# Patient Record
Sex: FEMALE | Race: WHITE | NOT HISPANIC OR LATINO | Employment: UNEMPLOYED | URBAN - METROPOLITAN AREA
[De-identification: names, ages, dates, MRNs, and addresses within clinical notes are randomized per-mention and may not be internally consistent; named-entity substitution may affect disease eponyms.]

---

## 2019-03-24 ENCOUNTER — APPOINTMENT (EMERGENCY)
Dept: RADIOLOGY | Facility: HOSPITAL | Age: 79
End: 2019-03-24
Payer: COMMERCIAL

## 2019-03-24 ENCOUNTER — HOSPITAL ENCOUNTER (EMERGENCY)
Facility: HOSPITAL | Age: 79
Discharge: HOME | End: 2019-03-24
Attending: EMERGENCY MEDICINE | Admitting: EMERGENCY MEDICINE
Payer: COMMERCIAL

## 2019-03-24 VITALS
OXYGEN SATURATION: 99 % | DIASTOLIC BLOOD PRESSURE: 78 MMHG | TEMPERATURE: 97.9 F | RESPIRATION RATE: 16 BRPM | HEART RATE: 81 BPM | SYSTOLIC BLOOD PRESSURE: 137 MMHG | WEIGHT: 178 LBS

## 2019-03-24 DIAGNOSIS — I35.0 AORTIC STENOSIS: ICD-10-CM

## 2019-03-24 DIAGNOSIS — R55 SYNCOPE: Primary | ICD-10-CM

## 2019-03-24 LAB
ALBUMIN SERPL BCP-MCNC: 4 G/DL (ref 3–5.2)
ALP SERPL-CCNC: 68 U/L (ref 43–122)
ALT SERPL W P-5'-P-CCNC: 18 U/L (ref 9–52)
ANION GAP SERPL CALCULATED.3IONS-SCNC: 10 MMOL/L (ref 5–14)
AST SERPL W P-5'-P-CCNC: 19 U/L (ref 14–36)
BASOPHILS # BLD AUTO: 0 THOUSANDS/ΜL (ref 0–0.1)
BASOPHILS NFR BLD AUTO: 0 % (ref 0–1)
BILIRUB SERPL-MCNC: 0.4 MG/DL
BUN SERPL-MCNC: 28 MG/DL (ref 5–25)
CALCIUM SERPL-MCNC: 9.6 MG/DL (ref 8.4–10.2)
CHLORIDE SERPL-SCNC: 100 MMOL/L (ref 97–108)
CO2 SERPL-SCNC: 26 MMOL/L (ref 22–30)
CREAT SERPL-MCNC: 0.91 MG/DL (ref 0.6–1.2)
EOSINOPHIL # BLD AUTO: 0.2 THOUSAND/ΜL (ref 0–0.4)
EOSINOPHIL NFR BLD AUTO: 2 % (ref 0–6)
ERYTHROCYTE [DISTWIDTH] IN BLOOD BY AUTOMATED COUNT: 13.7 %
GFR SERPL CREATININE-BSD FRML MDRD: 61 ML/MIN/1.73SQ M
GLUCOSE SERPL-MCNC: 153 MG/DL (ref 70–99)
GLUCOSE SERPL-MCNC: 158 MG/DL (ref 65–140)
HCT VFR BLD AUTO: 36.8 % (ref 36–46)
HGB BLD-MCNC: 12 G/DL (ref 12–16)
LYMPHOCYTES # BLD AUTO: 2.1 THOUSANDS/ΜL (ref 0.5–4)
LYMPHOCYTES NFR BLD AUTO: 23 % (ref 25–45)
MCH RBC QN AUTO: 28.6 PG (ref 26–34)
MCHC RBC AUTO-ENTMCNC: 32.5 G/DL (ref 31–36)
MCV RBC AUTO: 88 FL (ref 80–100)
MONOCYTES # BLD AUTO: 0.8 THOUSAND/ΜL (ref 0.2–0.9)
MONOCYTES NFR BLD AUTO: 9 % (ref 1–10)
NEUTROPHILS # BLD AUTO: 6.2 THOUSANDS/ΜL (ref 1.8–7.8)
NEUTS SEG NFR BLD AUTO: 67 % (ref 45–65)
PLATELET # BLD AUTO: 240 THOUSANDS/UL (ref 150–450)
PMV BLD AUTO: 8 FL (ref 8.9–12.7)
POTASSIUM SERPL-SCNC: 4.8 MMOL/L (ref 3.6–5)
PROT SERPL-MCNC: 7.4 G/DL (ref 5.9–8.4)
RBC # BLD AUTO: 4.19 MILLION/UL (ref 4–5.2)
SODIUM SERPL-SCNC: 136 MMOL/L (ref 137–147)
TROPONIN I SERPL-MCNC: <0.01 NG/ML (ref 0–0.03)
WBC # BLD AUTO: 9.3 THOUSAND/UL (ref 4.5–11)

## 2019-03-24 PROCEDURE — 93005 ELECTROCARDIOGRAM TRACING: CPT

## 2019-03-24 PROCEDURE — 84484 ASSAY OF TROPONIN QUANT: CPT | Performed by: EMERGENCY MEDICINE

## 2019-03-24 PROCEDURE — 85025 COMPLETE CBC W/AUTO DIFF WBC: CPT | Performed by: EMERGENCY MEDICINE

## 2019-03-24 PROCEDURE — 36415 COLL VENOUS BLD VENIPUNCTURE: CPT | Performed by: EMERGENCY MEDICINE

## 2019-03-24 PROCEDURE — 99284 EMERGENCY DEPT VISIT MOD MDM: CPT

## 2019-03-24 PROCEDURE — 71046 X-RAY EXAM CHEST 2 VIEWS: CPT

## 2019-03-24 PROCEDURE — 82948 REAGENT STRIP/BLOOD GLUCOSE: CPT

## 2019-03-24 PROCEDURE — 80053 COMPREHEN METABOLIC PANEL: CPT | Performed by: EMERGENCY MEDICINE

## 2019-03-24 NOTE — ED NOTES
Pt changed into a gown and made comfortable in room  Pt placed on monitor for continuous cardiac monitoring  EKG done and given to Dr  For review   Glucose done 158 Dr Jagdish Padilla  03/24/19 4982

## 2019-03-24 NOTE — ED NOTES
Pt remains asymptomatic and with no complaints  Dr Kizzy Willoughby in speaking with pt about admission, pt refusing and requesting to go home  See provider charting         Anisha Berman RN  03/24/19 1157

## 2019-03-24 NOTE — ED NOTES
Pt missed toilet hat to collect urine sample    Labels had been scanned but no sample collected, will have pt try again later     Nina Call, PAMELA  03/24/19 5586

## 2019-03-24 NOTE — ED NOTES
Pt sitting up talking with     Pt has no complaints at this time     James Franco, RN  03/24/19 3426

## 2019-03-24 NOTE — ED PROVIDER NOTES
Pt Name: Williams Avalos  MRN: [id number]  [place] 1940  Age/Sex: 69 y o  female  Date of evaluation: 3/24/2019  PCP: Farida Carpenter    CHIEF COMPLAINT    Chief Complaint   Patient presents with    Syncope     pt had syncopal episode at [facility], witness stated to EMS pt was sitting and then slid over laying down on pew, (+) LOC for several seconds  pt arrives awake and alert, pt has no complaints, denies cp/sob/ha/dizzy         HPI    Oren Allen presents to the Emergency Department complaining of syncope  She was at [facility] and she was feeling warm  She was going to take off her blazer but she decided to leave it on since there was only 10 minutes left in the service  The next thing that she remembers is that she passed out in the pew  No chest pain or SOB  She is feeling normal now  She has no complaints  HPI      Past Medical and Surgical History    Past Medical History:   Diagnosis Date    Diabetes mellitus ([address] )     Hypertension        Past Surgical History:   Procedure Laterality Date    HYSTERECTOMY         History reviewed  No pertinent family history  Social History     Tobacco Use    Smoking status: Never Smoker    Smokeless tobacco: Never Used   Substance Use Topics    Alcohol use: Not Currently    Drug use: Never           Allergies    No Known Allergies    Home Medications    Prior to Admission medications    Not on File           Review of Systems    Review of Systems   Constitutional: Negative for activity change, appetite change, chills, diaphoresis, fatigue and fever  HENT: Negative for congestion, postnasal drip, rhinorrhea, sinus pressure, sneezing and sore throat  Eyes: Negative for pain and visual disturbance  Respiratory: Negative for cough, chest tightness and shortness of breath  Cardiovascular: Negative for chest pain, palpitations and leg swelling     Gastrointestinal: Negative for abdominal distention, abdominal pain, constipation, diarrhea, nausea and vomiting  Endocrine: Negative for polydipsia, polyphagia and polyuria  Genitourinary: Negative for decreased urine volume, difficulty urinating, dysuria, flank pain, frequency and hematuria  Musculoskeletal: Negative for arthralgias, gait problem, joint swelling and neck pain  Skin: Negative for pallor and rash  Allergic/Immunologic: Negative for immunocompromised state  Neurological: Negative for syncope, speech difficulty, weakness, light-headedness, numbness and headaches  All other systems reviewed and are negative  Physical Exam      ED Triage Vitals [03/24/19 1243]   Temperature Pulse Respirations Blood Pressure SpO2   97 9 °F (36 6 °C) 85 16 (!) 149/106 98 %      Temp Source Heart Rate Source Patient Position - Orthostatic VS BP Location FiO2 (%)   Tympanic Monitor Lying Left arm --      Pain Score       --               Physical Exam   Constitutional: She is oriented to person, place, and time  She appears well-developed and well-nourished  No distress  HENT:   Head: Normocephalic and atraumatic  Nose: Nose normal    Mouth/Throat: Oropharynx is clear and moist    Eyes: Pupils are equal, round, and reactive to light  Conjunctivae, EOM and lids are normal    Neck: Normal range of motion  Neck supple  Cardiovascular: Normal rate and regular rhythm  Exam reveals no gallop and no friction rub  Murmur heard  Systolic murmur is present with a grade of 4/6  Pulmonary/Chest: Effort normal and breath sounds normal  No accessory muscle usage  No respiratory distress  She has no wheezes  She has no rales  Abdominal: Soft  She exhibits no distension  There is no tenderness  There is no rebound and no guarding  Neurological: She is alert and oriented to person, place, and time  No cranial nerve deficit or sensory deficit  Skin: Skin is warm and dry  No rash noted  She is not diaphoretic  No erythema  Psychiatric: She has a normal mood and affect   Her speech is normal and behavior is normal  Judgment and thought content normal    Nursing note and vitals reviewed  Assessment and Plan    Walt Johns is a 69 y o  female who presents with syncope  Physical examination remarkable for significant murmur  Plan will be to perform diagnostic testing and treat symptomatically  MDM    Diagnostic Results      EKG INTERPRETATION  EKG Interpretation    Rate: 88 BPM  Rhythm: sinus with PVCs  Axis: normal  Intervals: Normal, RBBB, QTc 498ms  T waves: nonspecific  ST segments: nonspecific    Impression: abnormal EKG  EKG for comparison: not immediately available  EKG interpreted by me  Interpretation by Jossy Vaughn DO  EKG reviewed and interpreted independently      Labs:    Results for orders placed or performed during the hospital encounter of 03/24/19   CBC and differential   Result Value Ref Range    WBC 9 30 4 50 - 11 00 Thousand/uL    RBC 4 19 4 00 - 5 20 Million/uL    Hemoglobin 12 0 12 0 - 16 0 g/dL    Hematocrit 36 8 36 0 - 46 0 %    MCV 88 80 - 100 fL    MCH 28 6 26 0 - 34 0 pg    MCHC 32 5 31 0 - 36 0 g/dL    RDW 13 7 <15 3 %    MPV 8 0 (L) 8 9 - 12 7 fL    Platelets 140 462 - 128 Thousands/uL    Neutrophils Relative 67 (H) 45 - 65 %    Lymphocytes Relative 23 (L) 25 - 45 %    Monocytes Relative 9 1 - 10 %    Eosinophils Relative 2 0 - 6 %    Basophils Relative 0 0 - 1 %    Neutrophils Absolute 6 20 1 80 - 7 80 Thousands/µL    Lymphocytes Absolute 2 10 0 50 - 4 00 Thousands/µL    Monocytes Absolute 0 80 0 20 - 0 90 Thousand/µL    Eosinophils Absolute 0 20 0 00 - 0 40 Thousand/µL    Basophils Absolute 0 00 0 00 - 0 10 Thousands/µL   Comprehensive metabolic panel   Result Value Ref Range    Sodium 136 (L) 137 - 147 mmol/L    Potassium 4 8 3 6 - 5 0 mmol/L    Chloride 100 97 - 108 mmol/L    CO2 26 22 - 30 mmol/L    ANION GAP 10 5 - 14 mmol/L    BUN 28 (H) 5 - 25 mg/dL    Creatinine 0 91 0 60 - 1 20 mg/dL    Glucose 153 (H) 70 - 99 mg/dL    Calcium 9 6 8 4 - 10 2 mg/dL AST 19 14 - 36 U/L    ALT 18 9 - 52 U/L    Alkaline Phosphatase 68 43 - 122 U/L    Total Protein 7 4 5 9 - 8 4 g/dL    Albumin 4 0 3 0 - 5 2 g/dL    Total Bilirubin 0 40 <1 30 mg/dL    eGFR 61 >60 ml/min/1 73sq m   Troponin I   Result Value Ref Range    Troponin I <0 01 0 00 - 0 03 ng/mL   Fingerstick Glucose (POCT)   Result Value Ref Range    POC Glucose 158 (H) 65 - 140 mg/dl       All labs reviewed and utilized in the medical decision making process    Radiology:    XR chest 2 views    (Results Pending)     Normal    All radiology studies independently viewed by me and interpreted by the radiologist     Procedure    Procedures    [facility]      ED Course of Care and Re-Assessments    I had a long conversation with patient  I recommended admission/ cardiology eval and echo  She feels that this was related to feeling warm and being overdressed  She will arrange for outpatient eval   She understands that I am concerned regarding her murmur and last echo nearly a year ago with new syncope  She fells well and wants to go home, understanding the risks involved  Medications - No data to display        FINAL IMPRESSION    Final diagnoses:   Syncope   Aortic stenosis         DISPOSITION/PLAN    Time reflects when diagnosis was documented in both MDM as applicable and the Disposition within this note     Time User Action Codes Description Comment    3/24/2019  3:14 PM Jennifer Aguilera Add [R55] Syncope     3/24/2019  3:14 PM Jennifer Aguilera Add [I35 0] Aortic stenosis       ED Disposition     ED Disposition Condition Date/Time Comment    Discharge Stable Sun Mar 24, 2019  3:12 PM Russell Cockayne discharge to home/self care  Follow-up Information     Follow up With Specialties Details Why Contact [facility] Schedule an appointment as soon as possible for a visit in 1 day You should have an echocardiogram and cardiology evaluation   [address]  [address]  [address] [place] [address] Cardiology Schedule an appointment as soon as possible for a visit  You have aortic stenosis and had a syncopal episode and should be evaluated as soon as possible  [address] [address]  [phone]              PATIENT REFERRED TO:    Stefan Del Valle  [address]  [address] [facility]  [address]  [phone]    Schedule an appointment as soon as possible for a visit in 1 day  You should have an echocardiogram and cardiology evaluation  [facility]  [address] [address]  [phone]    Schedule an appointment as soon as possible for a visit   You have aortic stenosis and had a syncopal episode and should be evaluated as soon as possible  DISCHARGE MEDICATIONS:    Patient's Medications    No medications on file       No discharge procedures on file           DO Jocelyn Treviño DO  03/24/19 7205

## 2019-03-25 LAB
ATRIAL RATE: 88 BPM
P AXIS: 61 DEGREES
PR INTERVAL: 150 MS
QRS AXIS: 93 DEGREES
QRSD INTERVAL: 138 MS
QT INTERVAL: 412 MS
QTC INTERVAL: 498 MS
T WAVE AXIS: 52 DEGREES
VENTRICULAR RATE: 88 BPM

## 2019-03-25 PROCEDURE — 93010 ELECTROCARDIOGRAM REPORT: CPT | Performed by: INTERNAL MEDICINE

## 2019-05-17 ENCOUNTER — HOSPITAL ENCOUNTER (OUTPATIENT)
Dept: RADIOLOGY | Facility: IMAGING CENTER | Age: 79
Discharge: HOME | End: 2019-05-17
Payer: COMMERCIAL

## 2019-05-17 ENCOUNTER — TRANSCRIBE ORDERS (OUTPATIENT)
Dept: ADMINISTRATIVE | Facility: HOSPITAL | Age: 79
End: 2019-05-17

## 2019-05-17 DIAGNOSIS — M21.371 RIGHT FOOT DROP: ICD-10-CM

## 2019-05-17 DIAGNOSIS — M21.371 RIGHT FOOT DROP: Primary | ICD-10-CM

## 2019-05-17 PROCEDURE — 73564 X-RAY EXAM KNEE 4 OR MORE: CPT

## 2019-10-03 ENCOUNTER — HOSPITAL ENCOUNTER (OUTPATIENT)
Dept: RADIOLOGY | Facility: IMAGING CENTER | Age: 79
Discharge: HOME | End: 2019-10-03
Payer: COMMERCIAL

## 2019-10-03 ENCOUNTER — TRANSCRIBE ORDERS (OUTPATIENT)
Dept: RADIOLOGY | Facility: IMAGING CENTER | Age: 79
End: 2019-10-03

## 2019-10-03 DIAGNOSIS — R10.31 BILATERAL GROIN PAIN: Primary | ICD-10-CM

## 2019-10-03 DIAGNOSIS — R10.31 BILATERAL GROIN PAIN: ICD-10-CM

## 2019-10-03 DIAGNOSIS — R10.32 BILATERAL GROIN PAIN: Primary | ICD-10-CM

## 2019-10-03 DIAGNOSIS — R10.32 BILATERAL GROIN PAIN: ICD-10-CM

## 2019-10-03 PROCEDURE — 73521 X-RAY EXAM HIPS BI 2 VIEWS: CPT

## 2020-10-15 ENCOUNTER — HOSPITAL ENCOUNTER (OUTPATIENT)
Dept: RADIOLOGY | Facility: IMAGING CENTER | Age: 80
Discharge: HOME | End: 2020-10-15
Payer: COMMERCIAL

## 2020-10-15 ENCOUNTER — TRANSCRIBE ORDERS (OUTPATIENT)
Dept: ADMINISTRATIVE | Facility: HOSPITAL | Age: 80
End: 2020-10-15

## 2020-10-15 DIAGNOSIS — M25.561 ACUTE PAIN OF RIGHT KNEE: ICD-10-CM

## 2020-10-15 DIAGNOSIS — M25.561 ACUTE PAIN OF RIGHT KNEE: Primary | ICD-10-CM

## 2020-10-15 PROCEDURE — 73564 X-RAY EXAM KNEE 4 OR MORE: CPT
